# Patient Record
Sex: FEMALE | Race: WHITE
[De-identification: names, ages, dates, MRNs, and addresses within clinical notes are randomized per-mention and may not be internally consistent; named-entity substitution may affect disease eponyms.]

---

## 2018-03-13 ENCOUNTER — HOSPITAL ENCOUNTER (INPATIENT)
Dept: HOSPITAL 14 - H.EROB2 | Age: 28
LOS: 3 days | Discharge: HOME | DRG: 372 | End: 2018-03-16
Attending: OBSTETRICS & GYNECOLOGY | Admitting: OBSTETRICS & GYNECOLOGY
Payer: COMMERCIAL

## 2018-03-13 VITALS — BODY MASS INDEX: 24 KG/M2

## 2018-03-13 DIAGNOSIS — Z3A.39: ICD-10-CM

## 2018-03-13 DIAGNOSIS — Z82.49: ICD-10-CM

## 2018-03-13 DIAGNOSIS — T17.890A: ICD-10-CM

## 2018-03-13 LAB
BASOPHILS # BLD AUTO: 0 K/UL (ref 0–0.2)
BASOPHILS NFR BLD: 0.3 % (ref 0–2)
EOSINOPHIL # BLD AUTO: 0 K/UL (ref 0–0.7)
EOSINOPHIL NFR BLD: 0.1 % (ref 0–4)
ERYTHROCYTE [DISTWIDTH] IN BLOOD BY AUTOMATED COUNT: 16.7 % (ref 11.5–14.5)
HGB BLD-MCNC: 11.8 G/DL (ref 12–16)
LYMPHOCYTES # BLD AUTO: 1 K/UL (ref 1–4.3)
LYMPHOCYTES NFR BLD AUTO: 7.5 % (ref 20–40)
MCH RBC QN AUTO: 26.4 PG (ref 27–31)
MCHC RBC AUTO-ENTMCNC: 33.3 G/DL (ref 33–37)
MCV RBC AUTO: 79.3 FL (ref 81–99)
MONOCYTES # BLD: 0.7 K/UL (ref 0–0.8)
MONOCYTES NFR BLD: 5 % (ref 0–10)
NEUTROPHILS # BLD: 11.6 K/UL (ref 1.8–7)
NEUTROPHILS NFR BLD AUTO: 87.1 % (ref 50–75)
NRBC BLD AUTO-RTO: 0.3 % (ref 0–0)
PLATELET # BLD: 189 K/UL (ref 130–400)
PMV BLD AUTO: 9.4 FL (ref 7.2–11.7)
RBC # BLD AUTO: 4.47 MIL/UL (ref 3.8–5.2)
WBC # BLD AUTO: 13.4 K/UL (ref 4.8–10.8)

## 2018-03-13 PROCEDURE — 4A0HXCZ MEASUREMENT OF PRODUCTS OF CONCEPTION, CARDIAC RATE, EXTERNAL APPROACH: ICD-10-PCS | Performed by: OBSTETRICS & GYNECOLOGY

## 2018-03-14 VITALS — OXYGEN SATURATION: 100 %

## 2018-03-14 LAB
BASOPHILS NFR BLD: 1 % (ref 0–2)
LYMPHOCYTE: 8 % (ref 20–50)
MONOCYTE: 4 % (ref 0–10)
NEUTROPHILS NFR BLD AUTO: 87 % (ref 42–75)
PLATELET # BLD EST: NORMAL 10*3/UL
RBC MORPH BLD: NORMAL
TOTAL CELLS COUNTED BLD: 100

## 2018-03-14 NOTE — OBPN
===========================

Datetime: 03/14/2018 07:20

===========================

   

IP Progress Impression:  Normal progression of labor; Reassuring fetal heart rate

IP Informed Consent Obtain:  Vaginal Delivery; Risks, Benefits and Alternatives Discussed

IP Procedures:  Artificial ROM

IP Progress Plan:  Continue present management; Augmentation; Anticipate Vaginal Delivery

Pool Provider:  Positive

Membranes, Provider:  Ruptured

Amniotic Fluid Color, Provider:  Clear

Contraction Comments Provider:  2-5m

FHR - Baseline A Provider:  150

Fetal Presentation-Admit:  Vertex

IP Progress Note Comment:  Since 6am, she was 9cm...Pitocin at 4miu/h (just increased 7am)...Discussi
on with pt about augmentatoin...AROM clear fluid.

NICHD Accel Fetus A IP Provider:  15X15

FHR Category Provider Fetus A:  Category I

NICHD Variability Prov Fetus A:  Moderate 6-25bpm

Dilatation, Provider:  9

Effacement, Provider:  100

Station, Provider:  0

## 2018-03-14 NOTE — OBADHP
===========================

Datetime: 2018 05:58

===========================

   

FHR - Baseline A Provider:  150s

Membranes, Provider:  Bulging

NICHD Variability Prov Fetus A:  Moderate 6-25bpm

NICHD Accel Fetus A IP Provider:  15X15

FHR Category Provider Fetus A:  Category I

NICHD Decel Fetus A IP Provider:  None

Dilatation, Provider:  8-9

Effacement, Provider:  100

Station, Provider:  -1

   

===========================

Datetime: 2018 22:59

===========================

   

Admit Comment, IP Provider:  28 yo  at 39.5 weeks GA, IFEANYI on 3/15/18 based on [address] [place], [place] [address]ter [place], not c/w LMP presents to [facility] w/ c/o ctx q5 min since 4 pm this evening. Pt reports she went to [facility] this morning when her ctx was every 10 min, she was 2 cm dilated and was sent h
ome. Pt reports ctx last for a min and it's more intense and strong. Pt reports passing thick mucus p
lug, but denies LOF. +FM. Pt is GBS unknow. Pt has poor prenatal care. Last visit to [place] was
 on 2018.

      

   PNC: [place], last visit in 2018

   PNL: No third trimester labs available. GBS unknown, rpr neg, hiv neg, rubella immune, hbsag unkno
wn, 1hr GCT is 147, as per pt 3 hr GCT was WNL

   US: No record available, as per pt, she had last US at 27 weeks GA and it was normal.

   obhx:  x 1 in , full term, no complication, baby's wt: 5lbs 10 oz

   gynhx: pap neg, denies hx STI

   PMHX: Denies

   pshx: denies

   social hx: denies smoking cigarettes, drinking EtOH or using drugs.

   family hx: hx CAD in maternal grandfather

   medications: PNV

   Allergies: NKDA

      

   Assessment: 28 yo  IUP@39.5 weeks GA has painful contraction.

      

   Plan:

   Admit to L_D

   Continunous fetal heart tracing

   IVFs

   Hx poor prental care (last prenatal visit 2018)

   Labs: CBC, Type and screen, hiv, rpr, hebsag

   GBS uknown: offered GBS prophylaxis to patient, pt declines

   Pain management: pt desires epidural

      

   Sultan Lucas, pgy-1

   Case d/w on-call OB Hospitalist Dr. Kraft

      

   OB Hospitalist on-call.  with PGY1, I saw ans examined this patient...Early labor with painful CTX
 - will admit and observe labor progress...discussed labor, pain management delivery and postpartum c
are...check accucheck

Extremities - PN:  Normal

Abdomen - PN:  Normal

Back - PN:  Normal

Lungs - PN:  Normal

Heart - PN:  Normal

Neurologic - PN:  Normal

HEENT - PN:  Normal

General - PN:  Normal

Contraction Comments Provider:  q4min

Comments, ACOG Physical Exam:  Bedside US: cephalic presentation

   SVE: /-1

Pool Provider:  Negative

IP Prenatal Hx Assessment:  The Prenatal History has been Reviewed and it's incomplete

Vital Signs Provider:  Reviewed

IP Chief Complaint:  Uterine contractions; Maternal discomfort

Genitourinary Exam:  Normal

EGA AdmitDate IP:  39.5

IP Adm Impression:  Term, intrauterine pregnancy

IP Admit Plan:  Admit to unit; Initiate labor protocol

## 2018-03-14 NOTE — OBHP
===========================

Datetime: 2018 05:58

===========================

   

FHR - Baseline A Provider:  150s

Membranes, Provider:  Bulging

NICHD Variability Prov Fetus A:  Moderate 6-25bpm

NICHD Accel Fetus A IP Provider:  15X15

FHR Category Provider Fetus A:  Category I

NICHD Decel Fetus A IP Provider:  None

Dilatation, Provider:  8-9

Effacement, Provider:  100

Station, Provider:  -1

   

===========================

Datetime: 2018 22:59

===========================

   

IP Adm Impression:  Term, intrauterine pregnancy

IP Admit Plan:  Admit to unit; Initiate labor protocol

Admit Comment, IP Provider:  26 yo  at 39.5 weeks GA, IFEANYI on 3/15/18 based on [address] [place], [place] [address]ter [place]
S, not c/w LMP presents to [facility] w/ c/o ctx q5 min since 4 pm this evening. Pt reports she went to [facility] this morning when her ctx was every 10 min, she was 2 cm dilated and was sent h
ome. Pt reports ctx last for a min and it's more intense and strong. Pt reports passing thick mucus p
lug, but denies LOF. +FM. Pt is GBS unknow. Pt has poor prenatal care. Last visit to [place] was
 on 2018.

      

   PNC: [place], last visit in 2018

   PNL: No third trimester labs available. GBS unknown, rpr neg, hiv neg, rubella immune, hbsag unkno
wn, 1hr GCT is 147, as per pt 3 hr GCT was WNL

   US: No record available, as per pt, she had last US at 27 weeks GA and it was normal.

   obhx:  x 1 in , full term, no complication, baby's wt: 5lbs 10 oz

   gynhx: pap neg, denies hx STI

   PMHX: Denies

   pshx: denies

   social hx: denies smoking cigarettes, drinking EtOH or using drugs.

   family hx: hx CAD in maternal grandfather

   medications: PNV

   Allergies: NKDA

      

   Assessment: 26 yo  IUP@39.5 weeks GA has painful contraction.

      

   Plan:

   Admit to L_D

   Continunous fetal heart tracing

   IVFs

   Hx poor prental care (last prenatal visit 2018)

   Labs: CBC, Type and screen, hiv, rpr, hebsag

   GBS uknown: offered GBS prophylaxis to patient, pt declines

   Pain management: pt desires epidural

      

   [place], pgy-1

   Case d/w on-call OB Hospitalist Dr. Kraft

      

   OB Hospitalist on-call.  with PGY1, I saw ans examined this patient...Early labor with painful CTX
 - will admit and observe labor progress...discussed labor, pain management delivery and postpartum c
are...check accucheck

Extremities - PN:  Normal

Abdomen - PN:  Normal

Back - PN:  Normal

Lungs - PN:  Normal

Heart - PN:  Normal

Neurologic - PN:  Normal

HEENT - PN:  Normal

General - PN:  Normal

Contraction Comments Provider:  q4min

Comments, ACOG Physical Exam:  Bedside US: cephalic presentation

   SVE: /-1

Pool Provider:  Negative

IP Prenatal Hx Assessment:  The Prenatal History has been Reviewed and it's incomplete

EGA AdmitDate IP:  39.5

Vital Signs Provider:  Reviewed

IP Chief Complaint:  Uterine contractions; Maternal discomfort

Genitourinary Exam:  Normal

## 2018-03-14 NOTE — OBDS
===================================

DELIVERY PERSONNEL

===================================

   

Delivery Doctor:  CJ Bonilla MD

Circulator:  Taylor Ulloa RN

Anesthesiologist:  LEEANN Rich MD

Resident:  KEVIN Mccabe

   

===================================

MATERNAL INFORMATION

===================================

   

Delivery Anesthesia:  Epidural

Medications in Delivery:  Pitocin 30 mu/500 mL

Estimated  Blood Loss (ml):  150

Placenta Cultured:  No

Maternal Complications:  None

Provider Comments:  Normal spontaneous vaginal delivery of live female infant, over intact perineum w
ith epidural anesthesia. Babygirl was bulb suctioned and placed on mother's chest for skin skin stimu
lation. Cord was clamped and cut. Spontaneous delivery of placenta and no laceration appreciated. EBL
 150cc. Apgar 9/9. 

      

   Patient was seen with the resident I agree with the note. I was present in the delivery and assite
d the resident.

   

===================================

LABOR SUMMARY

===================================

   

EDC:  03/15/2018 00:00

No. Babies in Womb:  1

 Attempted:  No (Annotations: Data stored by [facility] on behalf of user)

Labor Anesthesia:  Epidural

   

===================================

LABOR INFORMATION

===================================

   

Reason for Induction:  Not Applicable

Onset of Labor:  2018 04:02

Complete Dilatation:  2018 08:15

Oxytocin:  Augmentation

Group B Beta Strep:  Not Done

Antibiotics # of Doses:  0

Antibiotics Time of Last Dose:  n/a

Steroids Given:  None

Reason Steroids Not Administered:  Not Applicable

   

===================================

MEMBRANES

===================================

   

Membranes Rupture Method:  Artificial

Rupture of Membranes:  2018 07:15

Length of Rupture (hrs):  1.53

Amniotic Fluid Color:  Clear

Amniotic Fluid Amount:  Moderate

Amniotic Fluid Odor:  None

   

===================================

STAGES OF LABOR

===================================

   

Stage 1 hrs:  4

Stage 1 min:  13

Stage 2 hrs:  0

Stage 2 min:  32

Stage 3 hrs:  0

Stage 3 min:  8

Total Time in Labor hrs:  4

Total Time in Labor min:  53

   

===================================

VAGINAL DELIVERY

===================================

   

Episiotomy:  None

Laceration Extension:  N/A

Laceration Type:  None

Laceration Repair:  Not Applicable

Initial Vag Sponge Count:  5

Final Vag Sponge Count:  5

Initial Vag Sharps Count:  0

Final Vag Sharps Count:  0

Sponge Count Correct:  Yes

Sharps Count Correct:  N/A

   

===================================

BABY A INFORMATION

===================================

   

Infant Delivery Date/Time:  2018 08:47

Method of Delivery:  Vaginal

Born in Route :  No

:  N/A

Forceps:  N/A

Vacuum Extraction:  N/A

Shoulder Dystocia :  No

   

===================================

SHOULDER DYSTOCIA BABY A

===================================

   

Infant Delivery Date/Time:  2018 08:47

   

===================================

PRESENTATION/POSITION BABY A

===================================

   

Presentation:  Cephalic

Cephalic Presentation:  Vertex

Breech Presentation:  N/A

   

===================================

PLACENTA INFORMATION BABY A

===================================

   

Placenta Delivery Time :  2018 08:55

Placenta Method of Delivery:  Spontaneous

Placenta Status:  Delivered

   

===================================

APGAR SCORES BABY A

===================================

   

Heart Rate 1 min:  >100 bpm

Resp Effort 1 min:  Good Cry

Reflex Irritability 1 min:  Cough or Sneeze or Pulls Away

Muscle Tone 1 min:  Active Motion

Color 1 min:  Body Pink, Extremities Blue

Resuscitation Effort 1 min:  N/A

APGAR SCORE 1 MIN:  9

Heart Rate 5 min:  >100 bpm

Resp Effort 5 min:  Good Cry

Reflex Irritability 5 min:  Cough or Sneeze or Pulls Away

Muscle Tone 5 min:  Active Motion

Color 5 min:  Body Pink, Extremities Blue

Resuscitation Effort 5 min:  N/A

APGAR SCORE 5 MIN:  9

   

===================================

INFANT INFORMATION BABY A

===================================

   

Gestational Age at Delivery:  39.6

Gestational Status:  Term

Infant Outcome :  Liveborn

Infant Condition :  Stable

Infant Sex:  Female

   

===================================

IDENTIFICATION/MEDS BABY A

===================================

   

ID Band Number:  [id number]

   

===================================

WEIGHT/LENGTH BABY A

===================================

   

Infant Birthweight (gms):  2895

Infant Weight (lb):  6

Infant Weight (oz):  6

   

===================================

CORD INFORMATION BABY A

===================================

   

No. Cord Vessels:  3

Nuchal Cord :  N/A

Cord Blood Taken:  Yes

Infant Suction:  Mouth; Nose

## 2018-03-14 NOTE — OBPN
===========================

Datetime: 03/14/2018 05:58

===========================

   

IP Progress Impression Other:  slow progression

Membranes, Provider:  Bulging

FHR - Baseline A Provider:  150s

NICHD Accel Fetus A IP Provider:  15X15

FHR Category Provider Fetus A:  Category I

NICHD Variability Prov Fetus A:  Moderate 6-25bpm

Dilatation, Provider:  8-9

Effacement, Provider:  100

Station, Provider:  -1

NICHD Decel Fetus A IP Provider:  None

   

===========================

Datetime: 03/14/2018 04:00

===========================

   

IP Progress Impression:  Normal progression of labor

IP Progress Plan:  Continue present management; Anticipate Vaginal Delivery

IP Progress Note Comment:  SVE: 7-8/100/-2

      

   OB hospitaliston-call...Pt rec'd epidural. She feels fine.  

   

===========================

Datetime: 03/13/2018 22:59

===========================

   

Pool Provider:  Negative

Contraction Comments Provider:  q4min

Vital Signs Provider:  Reviewed

## 2018-03-14 NOTE — OBPN
===========================

Datetime: 03/14/2018 05:58

===========================

   

IP Progress Note Comment:  SVE: 8-9/100/-1

   Pt was 7-8 cm 2 hours ago, slowing progressing

   Will start Pitocin to augment the labor per protocol

   Membrance intact and bulging

   findings d/w patient and are in agreement with the plan.

      

   Sultan Lucas,pgy-1

   Case d/w on-call OB Hospitalist Dr. Kraft

      

   OB Hospitaliston-call agree with above note...start Pitocin augmentatoin

## 2018-03-15 LAB
ERYTHROCYTE [DISTWIDTH] IN BLOOD BY AUTOMATED COUNT: 17.2 % (ref 11.5–14.5)
HGB BLD-MCNC: 11.1 G/DL (ref 12–16)
MCH RBC QN AUTO: 27.2 PG (ref 27–31)
MCHC RBC AUTO-ENTMCNC: 34.2 G/DL (ref 33–37)
MCV RBC AUTO: 79.5 FL (ref 81–99)
PLATELET # BLD: 183 K/UL (ref 130–400)
RBC # BLD AUTO: 4.08 MIL/UL (ref 3.8–5.2)
WBC # BLD AUTO: 15.3 K/UL (ref 4.8–10.8)

## 2018-03-15 NOTE — OBPPN
===========================

Datetime: 03/15/2018 06:24

===========================

   

PP Pain Prov:  Within normal limits

PP Nausea Prov:  Denies

PP Flatus Prov:  Yes

PP BM Prov:  Yes

PP Heart Prov:  Normal

PP Lungs Prov:  Normal

PP Abdomen/Uterus Prov:  Normal

PP Lochia Prov:  Normal

PP CVA Tenderness Prov:  Normal

PP Impression Prov:  Normal postpartum progression

PP Plan Prov:  Continue present management

PP Progress Note Prov:  PPD 1

   26 y/o female now  on PPD 1 seen and examined at bedside this morning. No overnight events.
 Reports pain is controlled with pain medications. Pt reports she had BM last night and voiding freel
y w/o blood noted. Ambulating well w/o dizziness. Lochia is similar to menses volume. Pt is breastfee
ding the baby w/o difficulty. Denies nausea, vomiting, fever, chills, chest pain, dyspnea. Pt reports
 B/L lower extermity swelling for 2 weeks. Denies any calf pain or pain with ambulation.

      

   Physical Exam:

   General: A_O, resting comfortably in bed, NAD

   HEENT:  oral mucosa moist.

   Lungs: CTA B/L, no wheezing, rhonchi or rales

   CVS: RRR, S1, S2 

   ABD: ND, +BS, firm fundus @ umbilical level. Soft, appropriate TTP. 

   EXT: 1+ b/l pedal edema, +pedal pulse. No calf tenderness. Negative Dilma's sign. No erythema over
 the lower extermity.

   Neuro/psych: AAOX3.

      

   Assessment:    26 y/o female now   doing well on PPD 1 

                          Pedal edema

   Plan:

   OOB w/ caution

   Regular diet

   Ibuprofen for pain management

   Senokot and colace for constipation

   Lactation consults for breastfeeding

   Encourage breastfeeding and ambulation

   Advised to keep legs elevated when resting to decrease pedal edema.

   Anticipate discharge tomorrow

      

   Sultan Lucas, pgy-1

   Case d/w on-call OB hospitalist

      

   OB Hospitalist Addendum: PPD 1 s/p , doing well, bottle feeding

   Continue currrent care  

Vital Signs Provider PP:  Reviewed

## 2018-03-16 VITALS
TEMPERATURE: 97.6 F | HEART RATE: 86 BPM | SYSTOLIC BLOOD PRESSURE: 116 MMHG | DIASTOLIC BLOOD PRESSURE: 63 MMHG | RESPIRATION RATE: 20 BRPM

## 2019-01-27 ENCOUNTER — HOSPITAL ENCOUNTER (INPATIENT)
Dept: HOSPITAL 14 - H.EROB2 | Age: 29
LOS: 3 days | Discharge: HOME | DRG: 540 | End: 2019-01-30
Attending: OBSTETRICS & GYNECOLOGY | Admitting: OBSTETRICS & GYNECOLOGY
Payer: MEDICAID

## 2019-01-27 VITALS — BODY MASS INDEX: 22.3 KG/M2

## 2019-01-27 VITALS — OXYGEN SATURATION: 99 %

## 2019-01-27 DIAGNOSIS — Z23: ICD-10-CM

## 2019-01-27 DIAGNOSIS — O09.33: ICD-10-CM

## 2019-01-27 DIAGNOSIS — Z3A.38: ICD-10-CM

## 2019-01-27 LAB
BASOPHILS # BLD AUTO: 0.1 K/UL (ref 0–0.2)
BASOPHILS NFR BLD: 0.7 % (ref 0–2)
EOSINOPHIL # BLD AUTO: 0.2 K/UL (ref 0–0.7)
EOSINOPHIL NFR BLD: 1.6 % (ref 0–4)
ERYTHROCYTE [DISTWIDTH] IN BLOOD BY AUTOMATED COUNT: 18.3 % (ref 11.5–14.5)
HGB BLD-MCNC: 10.9 G/DL (ref 12–16)
LYMPHOCYTES # BLD AUTO: 2 K/UL (ref 1–4.3)
LYMPHOCYTES NFR BLD AUTO: 17.7 % (ref 20–40)
MCH RBC QN AUTO: 24.4 PG (ref 27–31)
MCHC RBC AUTO-ENTMCNC: 32.6 G/DL (ref 33–37)
MCV RBC AUTO: 74.8 FL (ref 81–99)
MONOCYTES # BLD: 0.8 K/UL (ref 0–0.8)
MONOCYTES NFR BLD: 7.6 % (ref 0–10)
NEUTROPHILS # BLD: 8 K/UL (ref 1.8–7)
NEUTROPHILS NFR BLD AUTO: 72.4 % (ref 50–75)
NRBC BLD AUTO-RTO: 0.1 % (ref 0–0)
PLATELET # BLD: 214 K/UL (ref 130–400)
PMV BLD AUTO: 8.6 FL (ref 7.2–11.7)
RBC # BLD AUTO: 4.45 MIL/UL (ref 3.8–5.2)
WBC # BLD AUTO: 11 K/UL (ref 4.8–10.8)

## 2019-01-27 PROCEDURE — 4A1HXCZ MONITORING OF PRODUCTS OF CONCEPTION, CARDIAC RATE, EXTERNAL APPROACH: ICD-10-PCS | Performed by: OBSTETRICS & GYNECOLOGY

## 2019-01-27 NOTE — OBADHP
===========================

Datetime: 2019 13:43

===========================

   

Admit Comment, IP Provider:  27 y/o  @ approx. 38 weeks gestation with LMP of 2018 no prenatal 
care presented complaining of gush of fluid loss yesterday afternoon at about 1pm followed by continu
ed trickling of fluid. She also reported small amount of spotting with bright red blood yesterday aft
ernoon along with some contractions that started at 12am this morning, every 45 minutes, -7/10 in na
ture that have increased in frequency to every 10 minutes. + FM; last sexual activity was Thursday. S
he denied any f/c/n/v/d/cp or sob.

      

   OBGYNhx:  x 2 (11 years ago, F, 5.2lbs; 10 months ago, F, 6.3 lbs) no complications

   PMH: denies

   Family hx: denies

   Sochx: denies tobacco, EtOH or elicit drug use

   Allergies: denies

   Meds: prenatals

   ROS: all 12 points reviewed and are negative.

      

   VS: wnl

   Gen: awake, alert female breathing comfortably

   Cardio: s1s2, RRR

   Resp: cta b/l

   Abd: soft, nontender, BS+

   Pelvic: sterile speculum exam- 6cm dilated, breech position (confirmed by ultrasound)

   Ext: calves nontender, nonedematous

      

   A/P: 27 y/o  @ approx. 38 weeks gestation with LMP of 2018 _ prenatal care.

   -6cm dilated with breech position therefore  protocol initiated.

   -FHR confirmed @ 137 bpm.

   -Prenatal labs drawn.

   -Continue to monitor vital. 

      

   Patient seen and examined with Dr. Townsend

   -Jaelyn Conteh, PGY-1

      

   Addendum by Dr. Townsend: I have evaluated the patient independently and I agree with the above. 

Extremities - PN:  Normal

Abdomen - PN:  Normal

Lungs - PN:  Normal

Heart - PN:  Normal

General - PN:  Normal

FHR - Baseline A Provider:  160

Pool Provider:  Positive

Vital Signs Provider:  Reviewed; Within Normal Limits

IP Chief Complaint:  Uterine contractions; Suspected ruptured membranes; Vaginal bleeding

NICHD Variability Prov Fetus A:  Moderate 6-25bpm

NICHD Accel Fetus A IP Provider:  15X15

FHR Category Provider Fetus A:  Category I

NICHD Decel Fetus A IP Provider:  None

Dilatation, Provider:  6

EGA AdmitDate IP:  38.0

IP Adm Impression:  Term, intrauterine pregnancy

IP Admit Plan:  Admit to unit; Initiate  Section protocol

## 2019-01-27 NOTE — OBDS
===================================

MATERNAL INFORMATION

===================================

   

Provider Comments:  Surgeon: Dr. Townsend

   Assistant: Dr. Tejeda

   Pre-op Dx: Term pregnancy no prenatal care, ROM, Breech presentation with advanced dilation

   Surgery: LTCS

   Post-op Dx: Same

   Findings: Live male infant, susanna breech presentation, clear fluid, grossly nml tubes, ovaries, pl
acenta, uterus, 5lbs 5 oz, 9/9

   Anesthesia: Spinal by Dr. Beltran

   EBL: 600mL

   UO:

   Total Input:

   Complications: None

   Condition: Stable

   Pathology: Cord blood, Placenta

   

===================================

STAGES OF LABOR

===================================

   

Stage 3 hrs:  0

Stage 3 min:  1

   

===================================

BABY A INFORMATION

===================================

   

Infant Delivery Date/Time:  2019 12:54

Method of Delivery:  

Born in Route :  No

:  N/A

   

===================================

SHOULDER DYSTOCIA BABY A

===================================

   

Infant Delivery Date/Time:  2019 12:54

   

===================================

PRESENTATION/POSITION BABY A

===================================

   

Presentation:  Breech

Breech Presentation:  Susanna

   

===================================

PLACENTA INFORMATION BABY A

===================================

   

Placenta Delivery Time :  2019 12:55

Placenta Method of Delivery:  Expressed

Placenta Status:  Delivered

   

===================================

APGAR SCORES BABY A

===================================

   

Heart Rate 1 min:  >100 bpm

Resp Effort 1 min:  Good Cry

Reflex Irritability 1 min:  Cough or Sneeze or Pulls Away

Muscle Tone 1 min:  Active Motion

Color 1 min:  Body Pink, Extremities Blue

APGAR SCORE 1 MIN:  9

Heart Rate 5 min:  >100 bpm

Resp Effort 5 min:  Good Cry

Reflex Irritability 5 min:  Cough or Sneeze or Pulls Away

Muscle Tone 5 min:  Active Motion

Color 5 min:  Body Pink, Extremities Blue

APGAR SCORE 5 MIN:  9

   

===================================

INFANT INFORMATION BABY A

===================================

   

Gestational Age at Delivery:  38.0

Gestational Status:  Term

Infant Outcome :  Liveborn

Infant Condition :  Stable

Infant Sex:  Male

   

===================================

WEIGHT/LENGTH BABY A

===================================

   

Infant Birthweight (gms):  2548

Infant Weight (lb):  5

Infant Weight (oz):  10

   

===================================

CORD INFORMATION BABY A

===================================

   

No. Cord Vessels:  3

Nuchal Cord :  N/A

Cord Blood Taken:  Yes

Infant Suction:  Mouth

## 2019-01-28 LAB
ERYTHROCYTE [DISTWIDTH] IN BLOOD BY AUTOMATED COUNT: 18.2 % (ref 11.5–14.5)
HGB BLD-MCNC: 8.6 G/DL (ref 12–16)
MCH RBC QN AUTO: 24.2 PG (ref 27–31)
MCHC RBC AUTO-ENTMCNC: 32.9 G/DL (ref 33–37)
MCV RBC AUTO: 73.7 FL (ref 81–99)
PLATELET # BLD: 162 K/UL (ref 130–400)
RBC # BLD AUTO: 3.56 MIL/UL (ref 3.8–5.2)
WBC # BLD AUTO: 14.3 K/UL (ref 4.8–10.8)

## 2019-01-28 RX ADMIN — MULTIPLE VITAMINS W/ MINERALS TAB SCH TAB: TAB at 09:05

## 2019-01-28 RX ADMIN — OXYCODONE HYDROCHLORIDE AND ACETAMINOPHEN PRN TAB: 5; 325 TABLET ORAL at 19:41

## 2019-01-28 NOTE — OP
PROCEDURE DATE:  2019



PREOPERATIVE DIAGNOSES:

1.  Term pregnancy with no prenatal care.

2.  Confirmed rupture of membranes.

3.  A 6 cm dilated, in active labor.

4.  Susanna breech presentation confirmed by ultrasound.



POSTOPERATIVE DIAGNOSES:

1.  Term pregnancy with no prenatal care.

2.  Confirmed rupture of membranes.

3.  A 6 cm dilated, in active labor.

4.  Susanna breech presentation confirmed by ultrasound.



PROCEDURE:  Low-transverse  section.



SURGEON:  Dory Townsend MD



ASSISTANT:  Dr. Diamond Tejeda



TYPE OF ANESTHESIA:  Spinal.



ANESTHESIOLOGIST:  Vu Beltran MD



FINDINGS:  Live male infant, 5 pounds 10 ounces.  Susanna breech

presentation.  Clear fluid.  Grossly normal tubes, ovaries, placenta, and

uterus.



TOTAL FLUID INPUT:  1500 mL.



ESTIMATED BLOOD LOSS:  600 mL.



URINE OUTPUT:  50 mL.



COMPLICATIONS:  None.



CONDITION:  Stable.



PATHOLOGY SPECIMEN:  Placenta and cord blood.



INDICATION:  This is a 28-year-old G4, P2-0-0-2 at roughly 38 plus weeks

gestation based on LMP.  The patient presented to labor and delivery

reporting that she had bleeding and leaking since yesterday and had no

prenatal care in the pregnancy.  On exam, the patient was confirmed to be

grossly ruptured, 6 cm dilated and in active labor.  Ultrasound

presentation confirmed the patient was susanna breech presentation.  The

patient was urgently admitted to labor and delivery, IV was started, type

and cross, CBC, and consented for emergent  secondary to

malpresentation of the infant.  The patient was advised of the risks and

benefits of procedure including risk of bleeding, infection, damage to

surrounding organs such as bowel, bladder, ureter, and uterus.  The patient

verbalized understanding and signed informed consent.



DESCRIPTION OF PROCEDURE:  The patient was taken to OR.  Ancef was given

preoperatively as well as azithromycin.  IV fluids were running.  The

patient was given spinal anesthesia without difficulty.  The patient

prepped and draped in normal sterile fashion, dorsal supine position with a

leftward tilt.  Pfannenstiel skin incision was made with a scalpel, carried

through to the underlying layer of fascia with the scalpel and the Bovie. 

Fascia incised in the midline and the incision extended laterally with the

Bovie.  Kocher clamps were used to tent up the inferior aspect of this

incision which we dissected off of the underlying pyramidalis muscles with

Bovie.  In a similar fashion, we tented up the superior aspect of this

incision which we dissected off of the underlying rectus abdominis muscles

with Bovie.  The muscles were  bluntly at the midline.  We

extended the incision superiorly and fairly good visualization of all

underlying organs.  Peritoneal cavity was entered.  The lower uterine

segment was visualized and the vesicouterine peritoneum was identified,

grabbed with pickups, and a bladder flap was created with the Metzenbaum

scissors.  Lower uterine segment was incised in a transverse fashion with

the scalpel.  Uterine cavity was entered.  Infant was delivered in susanna

presentation atraumatically followed by body and head atraumatically. 

Mouth and nose were suctioned.  Cord clamped and cut.  Infant was handed

off to awaiting pediatric team.  Cord blood was obtained.  Placenta was

extracted manually.  Uterus exteriorized, cleared of all clots.  Uterine

incision was repaired with an 0 Vicryl stitch and second imbricating layer

was done with 0 Monocryl stitch.  The hysterotomy site appeared to be

hemostatic.  Uterus was returned to the abdomen.  Gutters were cleared of

all clots.  Again, the hysterotomy site was inspected and found to be

hemostatic.  Peritoneum was closed with 2-0 Monocryl and muscles were

imbricated with the same stitch.  Muscle was inspected, found to be

hemostatic.  Fascia was closed with an 0 Vicryl stitch.  Subcutaneous fat

was reapproximated with plain gut suture.  Skin was closed with 3-0

Monocryl.  Sponge, lap and needle counts were correct x4.  The patient was

taken to the recovery room in stable condition.  No other complications.







__________________________________________

Dory Townsend MD





DD:  2019 14:34:50

DT:  2019 14:39:15

Job # [id number]

## 2019-01-29 PROCEDURE — 3E02340 INTRODUCTION OF INFLUENZA VACCINE INTO MUSCLE, PERCUTANEOUS APPROACH: ICD-10-PCS | Performed by: OBSTETRICS & GYNECOLOGY

## 2019-01-29 PROCEDURE — 3E0234Z INTRODUCTION OF SERUM, TOXOID AND VACCINE INTO MUSCLE, PERCUTANEOUS APPROACH: ICD-10-PCS | Performed by: OBSTETRICS & GYNECOLOGY

## 2019-01-29 RX ADMIN — MULTIPLE VITAMINS W/ MINERALS TAB SCH TAB: TAB at 09:58

## 2019-01-29 RX ADMIN — GUAIFENESIN AND DEXTROMETHORPHAN PRN ML: 100; 10 SYRUP ORAL at 01:26

## 2019-01-29 RX ADMIN — OXYCODONE HYDROCHLORIDE AND ACETAMINOPHEN PRN TAB: 5; 325 TABLET ORAL at 22:43

## 2019-01-29 RX ADMIN — OXYCODONE HYDROCHLORIDE AND ACETAMINOPHEN PRN TAB: 5; 325 TABLET ORAL at 00:24

## 2019-01-29 RX ADMIN — GUAIFENESIN AND DEXTROMETHORPHAN PRN ML: 100; 10 SYRUP ORAL at 06:11

## 2019-01-29 NOTE — OBPPN
===========================

Datetime: 2019 06:38

===========================

   

PP Pain Prov:  Within normal limits

PP Nausea Prov:  Denies

PP Flatus Prov:  Yes

PP BM Prov:  No

PP Breasts Prov:  Not Done

PP Heart Prov:  Normal

PP Lungs Prov:  Normal

PP Abdomen/Uterus Prov:  Normal

PP Lochia Prov:  Not Done

PP Vulva/Perineum Prov:  Not Done

PP CVA Tenderness Prov:  Normal

PP Extremities Prov:  Normal

PP C/S Incision Prov:  Normal

PP Breastfeeding Progress Prov:  Normal

PP Impression Prov:  Normal postpartum progression

PP Plan Prov:  Continue present management

PP Progress Note Prov:  29 y/o  on  POD2, s/p 

   Pt was seen and examined this AM. No complaints at this time. Pain well controlled with pain medic
ations. Ambulating, tolerating PO, no breast-feeding difficulties. Passing flatus but no BM yet. Fole
y D/C, Dressing removed.  

      

   VSS 

   GEN: Laying upright in bed, breast feeding 

   Cardio: S1S2, RRR  

   Lungs: cta b/l 

   Abdomen: soft, nontender, BS+, appropriate tenderness to palpation; fundus @ level of umbilibus. u
mbilicus.   

   EXT: Non-edematous, calves non-tender to palpation   

      

   Assessment/Plan: 29 y/o , POD2, s/p  due to breech presentation 

   - HBsAg, HIV, RPR, rubella unremarkable 

   - Tdap and Flu vaccine to be given today 

   - Social work consulted as patient did not receive any prenatal care 

   - Percocet and Ibuprofen for pain 

   - Ambulation encouraged.  

   - Continue to encourage breastfeeding. 

   - Anticipated discharge 2019 

      

   Case discussed with attending  

      

   --- Baljinder Alva MD PGY-1

      

   Addendum by Dr. Townsend: I have evalauted the patient independently and I agree with the above

Vital Signs Provider PP:  Reviewed; Within Normal Limits

## 2019-01-30 VITALS
RESPIRATION RATE: 20 BRPM | DIASTOLIC BLOOD PRESSURE: 75 MMHG | HEART RATE: 85 BPM | TEMPERATURE: 98.5 F | SYSTOLIC BLOOD PRESSURE: 122 MMHG

## 2019-01-30 RX ADMIN — OXYCODONE HYDROCHLORIDE AND ACETAMINOPHEN PRN TAB: 5; 325 TABLET ORAL at 05:10

## 2019-01-30 RX ADMIN — MULTIPLE VITAMINS W/ MINERALS TAB SCH TAB: TAB at 08:50

## 2019-01-30 NOTE — OBPPN
===========================

Datetime: 2019 06:56

===========================

   

PP Pain Prov:  Within normal limits

PP Nausea Prov:  Denies

PP Flatus Prov:  Yes

PP BM Prov:  Yes

PP Breasts Prov:  Not Done

PP Heart Prov:  Normal

PP Lungs Prov:  Normal

PP Abdomen/Uterus Prov:  Normal

PP Lochia Prov:  Not Done

PP Vulva/Perineum Prov:  Not Done

PP CVA Tenderness Prov:  Normal

PP Extremities Prov:  Normal

PP C/S Incision Prov:  Normal

PP Breastfeeding Progress Prov:  Normal

PP Impression Prov:  Normal postpartum progression

PP Plan Prov:  Continue present management

PP Progress Note Prov:  29 y/o , POD3, s/p .

   Pt was seen and examined this AM. No complaints at this time. Pain well controlled with pain medic
ations. Ambulating, tolerating PO, no breast-feeding difficulties. Passing flatus and had BM yesterda
y. [place] D/C, Dressing removed.  

      

   VSS 

   GEN: Laying upright in bed, breast feeding 

   Cardio: S1S2, RRR  

   Lungs: cta b/l 

   Abdomen: soft, nontender, BS+, appropriate tenderness to palpation; fundus @ level of umbilicus. 

   EXT: Non-edematous, calves non-tender to palpation 

      

   Assessment/Plan: 29 y/o , POD3, s/p  due to breech presentation 

   - HBsAg, HIV, RPR, rubella unremarkable 

   - Received Tdap and Flu vaccine on 19 

   - Social work consulted as patient did not receive any prenatal care 

   - Percocet and Ibuprofen for pain 

   - Ambulation encouraged.  

   - Continue to encourage breastfeeding. 

   - Anticipated discharge today 2019 

      

   Case discussed with attending  

   --- Baljinder Alva MD PGY-1

      

   The patient was seen with the resident I agree with the note

Vital Signs Provider PP:  Reviewed; Within Normal Limits

## 2019-01-30 NOTE — OBDCSUM
===========================

Datetime: 2019 06:58

===========================

   

Discharged to, Provider:  Home

Follow up at, Provider:  1-2 weeks

Disch Instr Activity:  Normal activity; May Shower

Disch Instr Diet:  Regular

Discharge Instructions, Provider:  Routine instructions given

Discharge Diagnosis, Provider:  Term Pregnancy Delivered

Discharge Time:  2019 12:20

Follow up in weeks, Provider:  [place]

Disch Referrals:  None

Contraception discussed, Prov:  Yes

Disch Activity Restrictions:  No exercising; No lifting; No sexual activity; Nothing in vagina - Inte
rcourse, tampons, douche

Discharge Comment, Provider:  Discharge Summary  

   DOA: 2019  

   EGA:  38 wks 

   Diagnosis:  S/P Primary C- section due to breech position 

      

   Summary of pregnancy: 29y/o , 38.0 wks S/P Primary C- section due to breech position 

      

   L_D summary: Pt is s/p C- section. Pain was well controlled with pain meds. No nausea. Tolerated r
egular diet well. Passing flatus, voiding well w/o difficulties. Breast feeding without difficulty. L
ochia is similar to menses volume.  

      

   DOL: 19 at 12:54 

   NB: Male 

   APGAR:  

   Weight: 2548 gm 

   Lochia= menses, mild pain, controlled with medications  

   Blood type: O +, Antibody Neg 

   CBC pp: 8.6/26.3 

   Discharge Date: 2019  

      

   Time 10:00 AM  

      

   Discharge Instructions:  

      

   -Encourage breastfeeding  

   -Encourage ambulation 

   -Ibuprofen for mild-moderate pain and Percocet for severe pain PRN  

   -Iron for anemia 

   -Continue prenatal vitamins at home 

      

   - ED precautions: If excessive bleeding, pain that does not get relief, fever >100.4, palpitations
, SOB, CP or other concerning symptom go to the ED. 

      

   - PT was urged if feeling sad, mood swing, depression, neglect of baby, suicidal thoughts, homicid
al thoughts go to ER or call 911 for help 

      

   - Pt should go to her Primary care doctor if have difficulty with breast feeding 

      

   - F/U at [facility] in 1 week for wound check and 4-6 wks for postpartum checkup. 

      

    

   -- Baljinder Alva MD, PGY-1

   ------------------------------------------------------------

   Addendum @ 11:00 - patient seen and examined by me. I agree with the resident's assessment and keke
n except for any corrections made below. 

      

   28 year old G3 now P3 day #3 after primary  for breech presentation, no prenatal care. To
lerating PO, pain controlled, no fever/chills. Discussed importance of contraception post partum give
n intrapartum interval of only 10 months. Especially discussed the risk of a short intrapartum period
 after a  and this risk of placental abnormalities. Patient has only used condoms in the pas
t. Briefly educated on LARCs as an option. All questions answered. Patient discharged in stable condi
tion. 

      

   Elizabeth Tejeda MD

   OB Fellow

      

   The patient was ssen with the fellow and I agree with the note

Contraception after Delivery:  Undecided